# Patient Record
Sex: MALE | Race: WHITE
[De-identification: names, ages, dates, MRNs, and addresses within clinical notes are randomized per-mention and may not be internally consistent; named-entity substitution may affect disease eponyms.]

---

## 2017-05-05 ENCOUNTER — HOSPITAL ENCOUNTER (OUTPATIENT)
Dept: HOSPITAL 17 - HRSP | Age: 77
End: 2017-05-05
Attending: INTERNAL MEDICINE
Payer: MEDICARE

## 2017-05-05 DIAGNOSIS — J47.9: Primary | ICD-10-CM

## 2017-05-05 DIAGNOSIS — R06.02: ICD-10-CM

## 2017-05-05 PROCEDURE — 94729 DIFFUSING CAPACITY: CPT

## 2017-05-05 PROCEDURE — 94726 PLETHYSMOGRAPHY LUNG VOLUMES: CPT

## 2017-05-05 PROCEDURE — 94060 EVALUATION OF WHEEZING: CPT

## 2017-05-08 NOTE — RSPPFT
DATE OF PROCEDURE:   5/5/17



COMMENTS:   



VOLUMES

DYNAMIC:    FVC and FEV1 moderately reduced.

STATIC:    FRC mildly increased; RV moderately increased; TLC normal.

FLOWS:    FEV1% moderately reduced; FEF 25-75 severely reduced.

DIFFUSION:    Low normal.

FLOW VOLUME

      LOOP:    Pattern of variable intrathoracic airways obstruction.  



IMPRESSION:    

   

Moderate obstructive ventilatory defect with no significant reduction in diffusion. There 
is mild to moderate hyperinflation with increased airways resistance. There is improvement 
post-bronchodilator.

## 2017-08-31 ENCOUNTER — HOSPITAL ENCOUNTER (INPATIENT)
Dept: HOSPITAL 17 - NEPC | Age: 77
LOS: 3 days | Discharge: HOME HEALTH | DRG: 191 | End: 2017-09-03
Payer: MEDICARE

## 2017-08-31 VITALS
DIASTOLIC BLOOD PRESSURE: 88 MMHG | HEART RATE: 101 BPM | RESPIRATION RATE: 18 BRPM | SYSTOLIC BLOOD PRESSURE: 147 MMHG | OXYGEN SATURATION: 95 %

## 2017-08-31 VITALS
DIASTOLIC BLOOD PRESSURE: 77 MMHG | SYSTOLIC BLOOD PRESSURE: 152 MMHG | OXYGEN SATURATION: 95 % | HEART RATE: 100 BPM | RESPIRATION RATE: 32 BRPM

## 2017-08-31 VITALS — RESPIRATION RATE: 35 BRPM | OXYGEN SATURATION: 86 %

## 2017-08-31 VITALS
DIASTOLIC BLOOD PRESSURE: 84 MMHG | OXYGEN SATURATION: 91 % | RESPIRATION RATE: 20 BRPM | TEMPERATURE: 97.9 F | SYSTOLIC BLOOD PRESSURE: 164 MMHG | HEART RATE: 122 BPM

## 2017-08-31 VITALS
SYSTOLIC BLOOD PRESSURE: 147 MMHG | RESPIRATION RATE: 18 BRPM | DIASTOLIC BLOOD PRESSURE: 87 MMHG | TEMPERATURE: 98.1 F | HEART RATE: 108 BPM | OXYGEN SATURATION: 98 %

## 2017-08-31 VITALS
RESPIRATION RATE: 20 BRPM | HEART RATE: 115 BPM | SYSTOLIC BLOOD PRESSURE: 163 MMHG | OXYGEN SATURATION: 94 % | DIASTOLIC BLOOD PRESSURE: 83 MMHG | TEMPERATURE: 97.6 F

## 2017-08-31 VITALS — RESPIRATION RATE: 32 BRPM | OXYGEN SATURATION: 93 %

## 2017-08-31 VITALS — HEIGHT: 71 IN | BODY MASS INDEX: 21.7 KG/M2 | WEIGHT: 154.98 LBS

## 2017-08-31 DIAGNOSIS — Z92.21: ICD-10-CM

## 2017-08-31 DIAGNOSIS — J18.9: ICD-10-CM

## 2017-08-31 DIAGNOSIS — Q89.3: ICD-10-CM

## 2017-08-31 DIAGNOSIS — T38.0X5A: ICD-10-CM

## 2017-08-31 DIAGNOSIS — Z92.3: ICD-10-CM

## 2017-08-31 DIAGNOSIS — Z85.46: ICD-10-CM

## 2017-08-31 DIAGNOSIS — H91.90: ICD-10-CM

## 2017-08-31 DIAGNOSIS — I48.91: ICD-10-CM

## 2017-08-31 DIAGNOSIS — R73.03: ICD-10-CM

## 2017-08-31 DIAGNOSIS — L71.9: ICD-10-CM

## 2017-08-31 DIAGNOSIS — Z85.51: ICD-10-CM

## 2017-08-31 DIAGNOSIS — J47.0: Primary | ICD-10-CM

## 2017-08-31 DIAGNOSIS — F41.9: ICD-10-CM

## 2017-08-31 LAB
ALP SERPL-CCNC: 84 U/L (ref 45–117)
ALT SERPL-CCNC: 18 U/L (ref 12–78)
ANION GAP SERPL CALC-SCNC: 9 MEQ/L (ref 5–15)
APTT BLD: 29.3 SEC (ref 24.3–30.1)
AST SERPL-CCNC: 18 U/L (ref 15–37)
BASOPHILS # BLD AUTO: 0.1 TH/MM3 (ref 0–0.2)
BASOPHILS NFR BLD: 0.4 % (ref 0–2)
BILIRUB SERPL-MCNC: 0.4 MG/DL (ref 0.2–1)
BUN SERPL-MCNC: 16 MG/DL (ref 7–18)
CHLORIDE SERPL-SCNC: 102 MEQ/L (ref 98–107)
CK SERPL-CCNC: 66 U/L (ref 39–308)
EOSINOPHIL # BLD: 0.3 TH/MM3 (ref 0–0.4)
EOSINOPHIL NFR BLD: 1.9 % (ref 0–4)
ERYTHROCYTE [DISTWIDTH] IN BLOOD BY AUTOMATED COUNT: 15.6 % (ref 11.6–17.2)
GFR SERPLBLD BASED ON 1.73 SQ M-ARVRAT: 75 ML/MIN (ref 89–?)
HCO3 BLD-SCNC: 26.6 MEQ/L (ref 21–32)
HCT VFR BLD CALC: 45.9 % (ref 39–51)
HEMO FLAGS: (no result)
INR PPP: 1 RATIO
LYMPHOCYTES # BLD AUTO: 0.7 TH/MM3 (ref 1–4.8)
LYMPHOCYTES NFR BLD AUTO: 4.5 % (ref 9–44)
MCH RBC QN AUTO: 27.5 PG (ref 27–34)
MCHC RBC AUTO-ENTMCNC: 32.1 % (ref 32–36)
MCV RBC AUTO: 85.8 FL (ref 80–100)
MONOCYTES NFR BLD: 9.4 % (ref 0–8)
NEUTROPHILS # BLD AUTO: 13.2 TH/MM3 (ref 1.8–7.7)
NEUTROPHILS NFR BLD AUTO: 83.8 % (ref 16–70)
PLATELET # BLD: 234 TH/MM3 (ref 150–450)
POTASSIUM SERPL-SCNC: 4 MEQ/L (ref 3.5–5.1)
PROTHROMBIN TIME: 11.3 SEC (ref 9.8–11.6)
RBC # BLD AUTO: 5.35 MIL/MM3 (ref 4.5–5.9)
SODIUM SERPL-SCNC: 138 MEQ/L (ref 136–145)
WBC # BLD AUTO: 15.8 TH/MM3 (ref 4–11)

## 2017-08-31 PROCEDURE — 85025 COMPLETE CBC W/AUTO DIFF WBC: CPT

## 2017-08-31 PROCEDURE — 83036 HEMOGLOBIN GLYCOSYLATED A1C: CPT

## 2017-08-31 PROCEDURE — 82550 ASSAY OF CK (CPK): CPT

## 2017-08-31 PROCEDURE — 82948 REAGENT STRIP/BLOOD GLUCOSE: CPT

## 2017-08-31 PROCEDURE — 94668 MNPJ CHEST WALL SBSQ: CPT

## 2017-08-31 PROCEDURE — 94640 AIRWAY INHALATION TREATMENT: CPT

## 2017-08-31 PROCEDURE — 84443 ASSAY THYROID STIM HORMONE: CPT

## 2017-08-31 PROCEDURE — 83880 ASSAY OF NATRIURETIC PEPTIDE: CPT

## 2017-08-31 PROCEDURE — 93005 ELECTROCARDIOGRAM TRACING: CPT

## 2017-08-31 PROCEDURE — 93306 TTE W/DOPPLER COMPLETE: CPT

## 2017-08-31 PROCEDURE — 71010: CPT

## 2017-08-31 PROCEDURE — 94667 MNPJ CHEST WALL 1ST: CPT

## 2017-08-31 PROCEDURE — 94620: CPT

## 2017-08-31 PROCEDURE — 87040 BLOOD CULTURE FOR BACTERIA: CPT

## 2017-08-31 PROCEDURE — 83735 ASSAY OF MAGNESIUM: CPT

## 2017-08-31 PROCEDURE — 96360 HYDRATION IV INFUSION INIT: CPT

## 2017-08-31 PROCEDURE — 80053 COMPREHEN METABOLIC PANEL: CPT

## 2017-08-31 PROCEDURE — 80048 BASIC METABOLIC PNL TOTAL CA: CPT

## 2017-08-31 PROCEDURE — 94664 DEMO&/EVAL PT USE INHALER: CPT

## 2017-08-31 PROCEDURE — 94150 VITAL CAPACITY TEST: CPT

## 2017-08-31 PROCEDURE — 85730 THROMBOPLASTIN TIME PARTIAL: CPT

## 2017-08-31 PROCEDURE — 84484 ASSAY OF TROPONIN QUANT: CPT

## 2017-08-31 PROCEDURE — 85610 PROTHROMBIN TIME: CPT

## 2017-08-31 PROCEDURE — 71275 CT ANGIOGRAPHY CHEST: CPT

## 2017-08-31 RX ADMIN — DILTIAZEM HYDROCHLORIDE SCH MG: 30 TABLET, FILM COATED ORAL at 23:37

## 2017-08-31 RX ADMIN — AZITHROMYCIN SCH MLS/HR: 500 INJECTION, POWDER, LYOPHILIZED, FOR SOLUTION INTRAVENOUS at 23:37

## 2017-08-31 RX ADMIN — Medication SCH ML: at 23:38

## 2017-08-31 RX ADMIN — ENOXAPARIN SODIUM SCH MG: 30 INJECTION SUBCUTANEOUS at 22:00

## 2017-08-31 RX ADMIN — METHYLPREDNISOLONE SODIUM SUCCINATE SCH MG: 40 INJECTION, POWDER, FOR SOLUTION INTRAMUSCULAR; INTRAVENOUS at 23:38

## 2017-08-31 RX ADMIN — IPRATROPIUM BROMIDE AND ALBUTEROL SULFATE SCH AMPULE: .5; 3 SOLUTION RESPIRATORY (INHALATION) at 22:00

## 2017-08-31 NOTE — HHI.HP
HPI


Service


[facility]


Primary Care Physician


Sudhir Hendricks MD


Admission Diagnosis





Pneumonia, bronchietasis, new onset afib


Chief Complaint:  


sob, heart racing


Travel History


International Travel<30 Days:  No


Contact w/Intl Traveler <30 Da:  No


Traveled to Known Affected Are:  No





Sepsis Criteria


SIRS Criteria (2 or more):  RR  > 20 or PaCO2 < 32, WBC > 94725, < 4000 or > 10

% bands


Sepsis Criteria (SIRS+source):  Infect source susp/known


History of Present Illness


Patient is 77-year-old male with long history of bronchiectasis, presents to 

emergency room for evaluation of shortness of breath.  Patient reports that he 

has been feeling short of breath for several years but worse the past few 

months.  He does not require supplemental oxygen as an outpatient.  Reports 

that he has been seeing Dr. Waldemar Brown for years for his bronchiectasis in 

the office and reports that he has had a workup including a CT study of his 

chest which showed "infection".  Patient reports that he has been on multiple 

antibiotics and is currently taking ciprofloxacin.  He takes doxycycline daily 

for rosacea and reportedly was on tobramycin recently.  Patient reports that he'

s had a productive cough with thick white sputum for the past few months.  

Previously the phlegm was a dark yellow color.  He denies hemoptysis or foreign 

travel.  He is originally from [place].  Has not been in the [facility] 

or [facility] recently.  Denies fevers.  Patient was seen in the office 

by Dr. Brown today, was sent to the ER as he has progressing shortness of 

breath.  Patient reports dyspnea on exertion as well as at rest.  Denies 

history of hypertension, hyperlipidemia, he is a non smoker. Reports that Dr. Brown requested a cardiac/pulmonary workup for patient as REYES has been ongoing 

and progressing over the past few months.  Denies chest pain.  Typically is 

quite active but has been limited by shortness of breath of late.  He has noted 

that her heart seems to race at times especially when he is anxious.  Reports 

that he has never had a bronchoscopy.





Review of Systems


Constitutional:  COMPLAINS OF: Fatigue, DENIES: Diaphoretic episodes, Fever, 

Weight gain, Weight loss, Chills, Dizziness, Change in appetite, Night Sweats


Endocrine:  DENIES: Heat/cold intolerance, Polydipsia, Polyuria, Polyphagia


Eyes:  DENIES: Blurred vision, Diplopia, Eye inflammation, Eye pain, Vision loss

, Photosensitivity, Double Vision


Ears, nose, mouth, throat:  COMPLAINS OF: Hoarseness, DENIES: Tinnitus, Hearing 

loss, Vertigo, Nasal discharge, Oral lesions, Throat pain, Ear Pain, Running 

Nose, Epistaxis, Sinus Pain, Toothache, Odynophagia


Respiratory:  COMPLAINS OF: Cough, Wheezing, Sputum production, Shortness of 

breath, DENIES: Apneas, Snoring, Hemoptysis


Cardiovascular:  COMPLAINS OF: Palpitations, Dyspnea on Exertion, DENIES: Chest 

pain, Syncope, PND, Lower Extremity Edema, Orthopnea, Claudication


Gastrointestinal:  DENIES: Abdominal pain, Black stools, Bloody stools, BRB per 

rectum, Constipation, Diarrhea, GERD, Nausea, Reflux, Vomiting, Difficulty 

Swallowing, Anorexia, See HPI


Musculoskeletal:  DENIES: Joint pain, Muscle aches, Stiffness, Joint Swelling, 

Back pain, Neck pain


Integumentary:  DENIES: Abnormal pigmentation, Nail changes, Pruritus, Rash


Hematologic/lymphatic:  DENIES: Bruising, Lymphadenopathy


Immunologic/allergic:  DENIES: Eczema, Urticaria


Neurologic:  DENIES: Abnormal gait, Headache, Localized weakness, Paresthesias, 

Seizures, Speech Problems, Tremor, Poor Balance


Psychiatric:  COMPLAINS OF: Anxiety





Past Family Social History


Past Medical History


Bronchiectasis


History of multiple pneumonias


Rosacea


History of prostate cancer


History of bladder cancer


Past Surgical History


Nasal polypectomy


Balloon septoplasty


Radiation therapy for prostate cancer


Local chemotherapy and resection for bladder cancer


Reported Medications


Albuterol Neb (Albuterol Sulfate) 1.25 Mg/3 Ml Neb 1.25 Mg NEB Q4HR NEB PRN


Ipratropium Neb (Ipratropium Bromide) 0.5 Mg/2.5 Ml Amp 0.5 Mg NEB Q12HR NEB


Cipro (Ciprofloxacin HCl) 500 Mg Tab 500 Mg PO BID


Allergies:  


Coded Allergies:  


     cephalexin (Verified  Allergy, Severe, Nausea/Vomiting, 17)


Family History


Mother  at age 75 from cervical cancer


Father  at age 84 of "broken heart"


No significant lung ailments


Social History


No tobacco in 35 years and at that time he only smoked a pipe on rare occasion


Rare alcohol use


Denies illicit drug use


Lives with his wife of 49 years


3 adult children, not living in the immediate area


Retired  from [place] who moved here in 


Attending a local [employer].





Physical Exam


Vital Signs





Vital Signs








  Date Time  Temp Pulse Resp B/P (MAP) Pulse Ox O2 Delivery O2 Flow Rate FiO2


 


17 20:04  101 18 147/88 (107) 95 Nasal Cannula 2.00 


 


17 18:12  100 32 152/77 (102) 95 Nasal Cannula 2.00 


 


17 18:10   35  86 Room Air  


 


17 16:01   32  93 Room Air  


 


17 15:31 97.9 122 20 164/84 (110) 91 Room Air  








Physical Exam


GENERAL: This is a well-nourished, well-developed patient, in no apparent 

distress.  Alert and oriented.  Pleasant.  Cooperative with exam.


SKIN: No rashes, ecchymoses or lesions. Cool and dry.


HEAD: Atraumatic. Normocephalic. No temporal or scalp tenderness.


EYES: Pupils equal round and reactive. Extraocular motions intact. No scleral 

icterus. No injection or drainage. 


ENT: Nose without bleeding, purulent drainage or septal hematoma. Airway patent.


NECK: Trachea midline. No JVD or lymphadenopathy. Supple, nontender, no 

meningeal signs.


CARDIOVASCULAR: Irregularly irregular rate and rhythm without murmurs, gallops, 

or rubs.  Rate in the low 100s on my exam 


RESPIRATORY: Coarse breath sounds throughout lung fields.. Breath sounds equal 

bilaterally.  Occasional expiratory wheeze with no fine crackles.  Fair air 

movement.  


GASTROINTESTINAL: Abdomen soft, non-tender, nondistended. No hepato-splenomegaly

, or palpable masses. No guarding.


MUSCULOSKELETAL: Extremities without clubbing, cyanosis, or edema. No joint 

tenderness, effusion, or edema noted. No calf tenderness. 


NEUROLOGICAL: Awake and alert. Cranial nerves II through XII intact.  Motor and 

sensory grossly within normal limits. Five out of 5 muscle strength in all 

muscle groups.  Normal speech.


Laboratory





Laboratory Tests








Test


  17


16:00


 


White Blood Count 15.8 


 


Red Blood Count 5.35 


 


Hemoglobin 14.7 


 


Hematocrit 45.9 


 


Mean Corpuscular Volume 85.8 


 


Mean Corpuscular Hemoglobin 27.5 


 


Mean Corpuscular Hemoglobin


Concent 32.1 


 


 


Red Cell Distribution Width 15.6 


 


Platelet Count 234 


 


Mean Platelet Volume 7.5 


 


Neutrophils (%) (Auto) 83.8 


 


Lymphocytes (%) (Auto) 4.5 


 


Monocytes (%) (Auto) 9.4 


 


Eosinophils (%) (Auto) 1.9 


 


Basophils (%) (Auto) 0.4 


 


Neutrophils # (Auto) 13.2 


 


Lymphocytes # (Auto) 0.7 


 


Monocytes # (Auto) 1.5 


 


Eosinophils # (Auto) 0.3 


 


Basophils # (Auto) 0.1 


 


CBC Comment DIFF FINAL 


 


Differential Comment  


 


Prothrombin Time 11.3 


 


Prothromb Time International


Ratio 1.0 


 


 


Activated Partial


Thromboplast Time 29.3 


 


 


Blood Urea Nitrogen 16 


 


Creatinine 0.97 


 


Random Glucose 87 


 


Total Protein 7.4 


 


Albumin 3.4 


 


Calcium Level 8.8 


 


Alkaline Phosphatase 84 


 


Aspartate Amino Transf


(AST/SGOT) 18 


 


 


Alanine Aminotransferase


(ALT/SGPT) 18 


 


 


Total Bilirubin 0.4 


 


Sodium Level 138 


 


Potassium Level 4.0 


 


Chloride Level 102 


 


Carbon Dioxide Level 26.6 


 


Anion Gap 9 


 


Estimat Glomerular Filtration


Rate 75 


 


 


Total Creatine Kinase 66 


 


Troponin I LESS THAN 0.02 


 


B-Type Natriuretic Peptide 27 








Result Diagram:  


17 1600                                                                   

             17 1600





Imaging





Last 72 hours Impressions








CT Angiography 17 1630 Signed





Impressions: 





 Service Date/Time:   17:28 - CONCLUSION:  1. No 





 evidence of pulmonary embolism. 2. Situs inversus totalis. 3. Bronchiectasis 





 greatest in the lung bases with chronic scarring and reticulonodular 

opacities. 





 4. Bilateral calcified pleural plaques. 5. Mediastinal and hilar adenopathy. 





 This may be reactive.      Rian Bingham MD 


 


Chest X-Ray 17 1551 Signed





Impressions: 





 Service Date/Time:   16:41 - CONCLUSION:  1. 





 Hyperinflation underlying emphysema. 2. Coarse infiltrate in both lung bases 





 with bronchiectasis.     Rian Bingham MD 











Septic Shock Reassessment


Heart:  Irregular


Lungs:  Course


Skin:  Warm








Peripheral Pulses:   Bounding Right Popliteal





 Bounding Left Popliteal





 Bounding Right Dorsalis Pedis





 Bounding Left Dorsalis Pedis








Capillary Refill:  Brisk





Caprini VTE Risk Assessment


Caprini VTE Risk Assessment:  Mod/High Risk (score >= 2)


Caprini Risk Assessment Model











 Point Value = 1          Point Value = 2  Point Value = 3  Point Value = 5


 


Age 41-60


Minor surgery


BMI > 25 kg/m2


Swollen legs


Varicose veins


Pregnancy or postpartum


History of unexplained or recurrent


   spontaneous 


Oral contraceptives or hormone


   replacement


Sepsis (< 1 month)


Serious lung disease, including


   pneumonia (< 1 month)


Abnormal pulmonary function


Acute myocardial infarction


Congestive heart failure (< 1 month)


History of inflammatory bowel disease


Medical patient at bed rest Age 61-74


Arthroscopic surgery


Major open surgery (> 45 min)


Laparoscopic surgery (> 45 min)


Malignancy


Confined to bed (> 72 hours)


Immobilizing plaster cast


Central venous access Age >= 75


History of VTE


Family history of VTE


Factor V Leiden


Prothrombin 28656U


Lupus anticoagulant


Anticardiolipin antibodies


Elevated serum homocysteine


Heparin-induced thrombocytopenia


Other congenital or acquired


   thrombophilia Stroke (< 1 month)


Elective arthroplasty


Hip, pelvis, or leg fracture


Acute spinal cord injury (< 1 month)








Prophylaxis Regimen











   Total Risk


Factor Score Risk Level Prophylaxis Regimen


 


0-1      Low Early ambulation


 


2 Moderate Order ONE of the following:


*Sequential Compression Device (SCD)


*Heparin 5000 units SQ BID


 


3-4 Higher Order ONE of the following medications:


*Heparin 5000 units SQ TID


*Enoxaparin/Lovenox 40 mg SQ daily (WT < 150 kg, CrCl > 30 mL/min)


*Enoxaparin/Lovenox 30 mg SQ daily (WT < 150 kg, CrCl > 10-29 mL/min)


*Enoxaparin/Lovenox 30 mg SQ BID (WT < 150 kg, CrCl > 30 mL/min)


AND/OR


*Sequential Compression Device (SCD)


 


5 or more Highest Order ONE of the following medications:


*Heparin 5000 units SQ TID (Preferred with Epidurals)


*Enoxaparin/Lovenox 40 mg SQ daily (WT < 150 kg, CrCl > 30 mL/min)


*Enoxaparin/Lovenox 30 mg SQ daily (WT < 150 kg, CrCl > 10-29 mL/min)


*Enoxaparin/Lovenox 30 mg SQ BID (WT < 150 kg, CrCl > 30 mL/min)


AND


*Sequential Compression Device (SCD)











Assessment and Plan


Problem List:  


(1) Pneumonia


ICD Codes:  J18.9 - Pneumonia, unspecified organism


Status:  Acute


Plan:  Likely source of underlying sepsis.


Vital signs relatively stable.


It is noted that patient has underlying bronchiectasis with situs inversus and 

multiple pneumonias.  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


I will add azithromycin for atypical organism coverage.


We'll have pulmonary see the patient for possible bronchoscopy given his long 

history.





(2) Bronchiectasis


ICD Codes:  J47.9 - Bronchiectasis, uncomplicated


Plan:  As noted above.





(3) Afib


ICD Codes:  I48.91 - Unspecified atrial fibrillation


Status:  Acute


Plan:  Appears to be relatively new onset and may be associated with underlying 

chronic lung condition.


We'll check labs, echo and rule out possible coronary etiology.


He appears relatively healthy but may benefit from a stress test at some point 

either during or shortly after this hospital stay.


We'll use oral Cardizem for rate control.





Code Status


Full


Discussed Condition With


Patient and ER provider.





Physician Certification


2 Midnight Certification Type:  Admission for Inpatient Services


Order for Inpatient Services


The services are ordered in accordance with Medicare regulations or non-

Medicare payer requirements, as applicable.  In the case of services not 

specified as inpatient-only, they are appropriately provided as inpatient 

services in accordance with the 2-midnight benchmark.


Estimated LOS (days):  3


 days is the estimated time the patient will need to remain in the hospital, 

assuming treatment plan goals are met and no additional complications.


Post-Hospital Plan:  Home





Problem Qualifiers





(1) Pneumonia:  


Qualified Codes:  J18.9 - Pneumonia, unspecified organism


(2) Afib:  


Qualified Codes:  I48.91 - Unspecified atrial fibrillation








Toy Ribera MD PhD Aug 31, 2017 20:45

## 2017-08-31 NOTE — RADRPT
EXAM DATE/TIME:  08/31/2017 17:28 

 

[facility] COMPARISON:     

No previous studies available for comparison.

 

 

INDICATIONS :     

Short of breath for three months.

                      

 

IV CONTRAST:     

60 cc Omnipaque 350 (iohexol) IV 

 

 

RADIATION DOSE:     

41 CTDIvol (mGy) 

 

 

MEDICAL HISTORY :     

Chronic obstructive pulmonary disease.  Sinus inversa

 

SURGICAL HISTORY :      

None. 

 

ENCOUNTER:      

Initial

 

ACUITY:      

3 months

 

PAIN SCALE:      

2/10

 

LOCATION:       

Bilateral chest 

 

TECHNIQUE:     

Volumetric scanning of the chest was performed using a pulmonary embolism protocol MIP images were re
constructed.  Using automated exposure control and adjustment of the mA and/or kV according to patien
t size, radiation dose was kept as low as reasonably achievable to obtain optimal diagnostic quality 
images.   DICOM format image data is available electronically for review and comparison.  

 

Follow-up recommendations for detected pulmonary nodules are based at a minimum on nodule size and pa
tient risk factors according to Fleischner Society Guidelines.

 

FINDINGS:     

Situs inversus totalis is present. There is a right-sided arch and abdominal organs are reversed.

 

PULMONARY ARTERIES:

No filling defects are seen in the pulmonary arteries through the segmental level.

 

LUNGS:     

There is no consolidation or pneumothorax .  No concerning pulmonary nodule is visualized. Bronchiect
asis is noted greatest in the lower lobes. Reticular nodular opacities are present in both lower lobe
s as well right greater than left. There is chronic scarring in the posterior lung bases. The lungs a
re hyperinflated.

 

PLEURAE:     

There bilateral calcified pleural plaques. There is no distinct effusion. 

 

MEDIASTINUM:     

There is good visualization of the great vessels of the middle mediastinum. The there is borderline a
denopathy in pretracheal region. There are prominent lymph nodes in both hilar regions and the subcar
inal region.. Coronary artery calcifications are present.

 

MUSCULOSKELETAL:     

Within normal limits for patient age.

 

MISCELLANEOUS:     

The visualized upper abdominal organs demonstrate no acute abnormality. Cystic structures are noted i
n both kidneys.

 

CONCLUSION:     

1. No evidence of pulmonary embolism.

2. Situs inversus totalis.

3. Bronchiectasis greatest in the lung bases with chronic scarring and reticulonodular opacities.

4. Bilateral calcified pleural plaques.

5. Mediastinal and hilar adenopathy. This may be reactive.

 

 

 

 

 iRan Bingham MD on August 31, 2017 at 17:53           

Board Certified Radiologist.

 This report was verified electronically.

## 2017-08-31 NOTE — PD
HPI


Chief Complaint:  Respiratory Distress


Time Seen by Provider:  15:39


Travel History


International Travel<30 days:  No


Contact w/Intl Traveler<30days:  No


Traveled to known affect area:  No





History of Present Illness


HPI


Patient is 77-year-old male with history of bronchiectasis, presents to 

emergency room for evaluation of shortness of breath.  Patient reports that he 

has been feeling short of breath for the past few months.  Reports that he has 

been seeing Dr. Waldemar Brown in the office and reports that he has had a workup 

including a CT study of his chest which showed infection.  Patient reports that 

he has been on multiple antibiotics and is currently taking ciprofloxacin.  

Patient reports that he's had a productive cough with thick white sputum for 

the past few months.  Patient was seen in the office by Dr. brown today, was 

sent to the ER as he has progressing shortness of breath.  Patient reports 

dyspnea on exertion as well as at rest.  Denies history of hypertension, 

hyperlipidemia, he is a non smoker. Reports that Dr. Brown requested a cardiac/

pulmonary workup for patient as REYES has been ongoing and progressing over the 

past few months.





PFSH


Past Medical History


COPD:  Yes


Diminished Hearing:  Yes ([place])


Medical other:  Yes (SITUS INVERSUS)


Respiratory:  Yes (copd)


Tetanus Vaccination:  > 5 Years


Influenza Vaccination:  No





Past Surgical History


Tonsillectomy:  Yes


Other Surgery:  Yes (NASAL POLYPS)





Social History


Alcohol Use:  No


Tobacco Use:  No


Substance Use:  No





Allergies-Medications


(Allergen,Severity, Reaction):  


Coded Allergies:  


     cephalexin (Verified  Allergy, Severe, Nausea/Vomiting, 8/31/17)


Reported Meds & Prescriptions





Reported Meds & Active Scripts


Active


Reported


Albuterol Neb (Albuterol Sulfate) 1.25 Mg/3 Ml Neb 1.25 Mg NEB Q4HR NEB PRN


Ipratropium Neb (Ipratropium Bromide) 0.5 Mg/2.5 Ml Amp 0.5 Mg NEB Q12HR NEB


Cipro (Ciprofloxacin HCl) 500 Mg Tab 500 Mg PO BID








Review of Systems


General / Constitutional:  No: Fever


Eyes:  No: Visual changes


HENT:  No: Headaches


Cardiovascular:  Positive: Tachycardia, No: Chest Pain or Discomfort


Respiratory:  Positive: Cough, Shortness of Breath


Gastrointestinal:  No: Abdominal Pain


Genitourinary:  No: Dysuria


Musculoskeletal:  No: Pain


Skin:  No Rash


Neurologic:  No: Weakness


Psychiatric:  No: Depression


Endocrine:  No: Polydipsia


Hematologic/Lymphatic:  No: Easy Bruising





Physical Exam


Narrative


GENERAL:mild distress


SKIN: Focused skin assessment warm/dry.


HEAD: Atraumatic. Normocephalic. 


EYES: Pupils equal and round. No scleral icterus. No injection or drainage. 


ENT: No nasal bleeding or discharge.  Mucous membranes pink and moist.


NECK: Trachea midline. No JVD. 


CARDIOVASCULAR: Tachycardic, irregular irregular.  No murmur appreciated.


RESPIRATORY: No accessory muscle use. Clear to auscultation. Breath sounds 

equal bilaterally. 


GASTROINTESTINAL: Abdomen soft, non-tender, nondistended. Hepatic and splenic 

margins not palpable. 


MUSCULOSKELETAL: No obvious deformities. No clubbing.  No cyanosis.  No edema. 


NEUROLOGICAL: Awake and alert. No obvious cranial nerve deficits.  Motor 

grossly within normal limits. Normal speech.


PSYCHIATRIC: Appropriate mood and affect; insight and judgment normal.





Data


Data


Last Documented VS





Vital Signs








  Date Time  Temp Pulse Resp B/P (MAP) Pulse Ox O2 Delivery O2 Flow Rate FiO2


 


8/31/17 18:12  100 20 152/77 (102) 92 Room Air  


 


8/31/17 15:31 97.9       








Orders





 Orders


Electrocardiogram (8/31/17 15:55)


B-Type Natriuretic Peptide (8/31/17 15:55)


Ckmb (Isoenzyme) Profile (8/31/17 15:55)


Complete Blood Count With Diff (8/31/17 15:55)


Comprehensive Metabolic Panel (8/31/17 15:55)


Prothrombin Time / Inr (Pt) (8/31/17 15:55)


Act Partial Throm Time (Ptt) (8/31/17 15:55)


Troponin I (8/31/17 15:55)


Chest, Single Ap (8/31/17 15:55)


Ecg Monitoring (8/31/17 15:55)


Iv Access Insert/Monitor (8/31/17 15:55)


Oximetry (8/31/17 15:55)


Sodium Chloride 0.9% Flush (Ns Flush) (8/31/17 16:00)


Sodium Chlorid 0.9% 500 Ml Inj (Ns 500 M (8/31/17 16:00)


Ct Pulmonary Angiogram (8/31/17 16:30)


Iohexol 350 Inj (Omnipaque 350 Inj) (8/31/17 15:28)


Aztreonam Inj (Azactam Inj) (8/31/17 18:30)


Levofloxacin 750 Mg Premix Inj (Levaquin (8/31/17 18:30)


Aspirin Chew (Aspirin Chew) (8/31/17 18:30)


Admit Order (Ed Use Only) (8/31/17 18:32)





Labs





Laboratory Tests








Test


  8/31/17


16:00


 


White Blood Count 15.8 TH/MM3 


 


Red Blood Count 5.35 MIL/MM3 


 


Hemoglobin 14.7 GM/DL 


 


Hematocrit 45.9 % 


 


Mean Corpuscular Volume 85.8 FL 


 


Mean Corpuscular Hemoglobin 27.5 PG 


 


Mean Corpuscular Hemoglobin


Concent 32.1 % 


 


 


Red Cell Distribution Width 15.6 % 


 


Platelet Count 234 TH/MM3 


 


Mean Platelet Volume 7.5 FL 


 


Neutrophils (%) (Auto) 83.8 % 


 


Lymphocytes (%) (Auto) 4.5 % 


 


Monocytes (%) (Auto) 9.4 % 


 


Eosinophils (%) (Auto) 1.9 % 


 


Basophils (%) (Auto) 0.4 % 


 


Neutrophils # (Auto) 13.2 TH/MM3 


 


Lymphocytes # (Auto) 0.7 TH/MM3 


 


Monocytes # (Auto) 1.5 TH/MM3 


 


Eosinophils # (Auto) 0.3 TH/MM3 


 


Basophils # (Auto) 0.1 TH/MM3 


 


CBC Comment DIFF FINAL 


 


Differential Comment  


 


Prothrombin Time 11.3 SEC 


 


Prothromb Time International


Ratio 1.0 RATIO 


 


 


Activated Partial


Thromboplast Time 29.3 SEC 


 


 


Blood Urea Nitrogen 16 MG/DL 


 


Creatinine 0.97 MG/DL 


 


Random Glucose 87 MG/DL 


 


Total Protein 7.4 GM/DL 


 


Albumin 3.4 GM/DL 


 


Calcium Level 8.8 MG/DL 


 


Alkaline Phosphatase 84 U/L 


 


Aspartate Amino Transf


(AST/SGOT) 18 U/L 


 


 


Alanine Aminotransferase


(ALT/SGPT) 18 U/L 


 


 


Total Bilirubin 0.4 MG/DL 


 


Sodium Level 138 MEQ/L 


 


Potassium Level 4.0 MEQ/L 


 


Chloride Level 102 MEQ/L 


 


Carbon Dioxide Level 26.6 MEQ/L 


 


Anion Gap 9 MEQ/L 


 


Estimat Glomerular Filtration


Rate 75 ML/MIN 


 


 


Total Creatine Kinase 66 U/L 


 


Troponin I


  LESS THAN 0.02


NG/ML


 


B-Type Natriuretic Peptide 27 PG/ML 











MDM


Medical Decision Making


Medical Screen Exam Complete:  Yes


Emergency Medical Condition:  Yes


Interpretation(s)


EKG at 1555: A fib at 110, qt/qtc: 327/392


Differential Diagnosis


Differential includes PE, A. fib with RVR, CHF exacerbation, PE, pneumonia, 

electrolyte abnormality


Narrative Course


Patient is a 77-year-old male sent into the hospital by Dr. Brown for 

evaluation of sob.  Patient has been having shortness of breath for the past 

few months, reports that dyspnea on exertion has progressively gotten worse. He 

currently is on a course of antibiotics as he had a CT study of his chest a few 

months ago which showed infection








Vital Signs








  Date Time  Temp Pulse Resp B/P (MAP) Pulse Ox O2 Delivery O2 Flow Rate FiO2


 


8/31/17 18:12  100 20 152/77 (102) 92 Room Air  


 


8/31/17 16:01   32  93 Room Air  


 


8/31/17 15:31 97.9 122 20 164/84 (110) 91 Room Air  








Laboratory Tests








Test


  8/31/17


16:00


 


White Blood Count


  15.8 TH/MM3


(4.0-11.0)


 


Red Blood Count


  5.35 MIL/MM3


(4.50-5.90)


 


Hemoglobin


  14.7 GM/DL


(13.0-17.0)


 


Hematocrit


  45.9 %


(39.0-51.0)


 


Mean Corpuscular Volume


  85.8 FL


(80.0-100.0)


 


Mean Corpuscular Hemoglobin


  27.5 PG


(27.0-34.0)


 


Mean Corpuscular Hemoglobin


Concent 32.1 %


(32.0-36.0)


 


Red Cell Distribution Width


  15.6 %


(11.6-17.2)


 


Platelet Count


  234 TH/MM3


(150-450)


 


Mean Platelet Volume


  7.5 FL


(7.0-11.0)


 


Neutrophils (%) (Auto)


  83.8 %


(16.0-70.0)


 


Lymphocytes (%) (Auto)


  4.5 %


(9.0-44.0)


 


Monocytes (%) (Auto)


  9.4 %


(0.0-8.0)


 


Eosinophils (%) (Auto)


  1.9 %


(0.0-4.0)


 


Basophils (%) (Auto)


  0.4 %


(0.0-2.0)


 


Neutrophils # (Auto)


  13.2 TH/MM3


(1.8-7.7)


 


Lymphocytes # (Auto)


  0.7 TH/MM3


(1.0-4.8)


 


Monocytes # (Auto)


  1.5 TH/MM3


(0-0.9)


 


Eosinophils # (Auto)


  0.3 TH/MM3


(0-0.4)


 


Basophils # (Auto)


  0.1 TH/MM3


(0-0.2)


 


CBC Comment DIFF FINAL 


 


Differential Comment  


 


Prothrombin Time


  11.3 SEC


(9.8-11.6)


 


Prothromb Time International


Ratio 1.0 RATIO 


 


 


Activated Partial


Thromboplast Time 29.3 SEC


(24.3-30.1)


 


Blood Urea Nitrogen


  16 MG/DL


(7-18)


 


Creatinine


  0.97 MG/DL


(0.60-1.30)


 


Random Glucose


  87 MG/DL


()


 


Total Protein


  7.4 GM/DL


(6.4-8.2)


 


Albumin


  3.4 GM/DL


(3.4-5.0)


 


Calcium Level


  8.8 MG/DL


(8.5-10.1)


 


Alkaline Phosphatase


  84 U/L


()


 


Aspartate Amino Transf


(AST/SGOT) 18 U/L (15-37) 


 


 


Alanine Aminotransferase


(ALT/SGPT) 18 U/L (12-78) 


 


 


Total Bilirubin


  0.4 MG/DL


(0.2-1.0)


 


Sodium Level


  138 MEQ/L


(136-145)


 


Potassium Level


  4.0 MEQ/L


(3.5-5.1)


 


Chloride Level


  102 MEQ/L


()


 


Carbon Dioxide Level


  26.6 MEQ/L


(21.0-32.0)


 


Anion Gap 9 MEQ/L (5-15) 


 


Estimat Glomerular Filtration


Rate 75 ML/MIN


(>89)


 


Total Creatine Kinase


  66 U/L


()


 


Troponin I


  LESS THAN 0.02


NG/ML


 


B-Type Natriuretic Peptide


  27 PG/ML


(0-100)








Last Impressions








CT Angiography 8/31/17 1630 Signed





Impressions: 





 Service Date/Time:  Thursday, August 31, 2017 17:28 - CONCLUSION:  1. No 





 evidence of pulmonary embolism. 2. Situs inversus totalis. 3. Bronchiectasis 





 greatest in the lung bases with chronic scarring and reticulonodular 

opacities. 





 4. Bilateral calcified pleural plaques. 5. Mediastinal and hilar adenopathy. 





 This may be reactive.      Rian Bingham MD 








patient with reticulonodular opacities with wbc of 15.8, patient with allergy 

to keflex, will treat with levaquin and azactam. Patient has been pancultured - 

will require admission. patient also with new onset afib





case reviewed with dr. joe who accepts pt to service under dr samuels





Diagnosis





 Primary Impression:  


 Pneumonia


 Qualified Codes:  J18.9 - Pneumonia, unspecified organism


 Additional Impressions:  


 SOB (shortness of breath)


 Afib


 Qualified Codes:  I48.91 - Unspecified atrial fibrillation





Admitting Information


Admitting Physician Requests:  Observation











Maria A Torres DO Aug 31, 2017 16:15

## 2017-08-31 NOTE — RADRPT
EXAM DATE/TIME:  08/31/2017 16:41 

 

[facility] COMPARISON:     

No previous studies available for comparison.

 

                     

INDICATIONS :     

Shortness of breath. Situs inversus.

                     

 

MEDICAL HISTORY :     

Chronic obstructive pulmonary disease.          

 

SURGICAL HISTORY :     

None.   

 

ENCOUNTER:     

Initial                                        

 

ACUITY:     

3 months      

 

PAIN SCORE:     

0/10

 

LOCATION:     

Bilateral chest 

 

FINDINGS:     

2 AP portable erect views of the chest were obtained and demonstrate situs inversus. There is hyperin
flation of both lungs with coarse opacity in both lower lobes and bronchiectasis. The heart size is a
t the upper limits of normal. There is no perihilar edema. Overlying retrocardiac leads are present. 
The bony thorax is intact.

 

CONCLUSION:     

1. Hyperinflation underlying emphysema.

2. Coarse infiltrate in both lung bases with bronchiectasis. 

 

 

 Rian Bingham MD on August 31, 2017 at 18:22           

Board Certified Radiologist.

 This report was verified electronically.

## 2017-09-01 VITALS
RESPIRATION RATE: 20 BRPM | HEART RATE: 81 BPM | TEMPERATURE: 97.8 F | OXYGEN SATURATION: 94 % | DIASTOLIC BLOOD PRESSURE: 67 MMHG | SYSTOLIC BLOOD PRESSURE: 147 MMHG

## 2017-09-01 VITALS
DIASTOLIC BLOOD PRESSURE: 83 MMHG | RESPIRATION RATE: 20 BRPM | HEART RATE: 90 BPM | OXYGEN SATURATION: 96 % | SYSTOLIC BLOOD PRESSURE: 138 MMHG | TEMPERATURE: 97.4 F

## 2017-09-01 VITALS
HEART RATE: 90 BPM | RESPIRATION RATE: 20 BRPM | TEMPERATURE: 97.6 F | OXYGEN SATURATION: 94 % | SYSTOLIC BLOOD PRESSURE: 155 MMHG | DIASTOLIC BLOOD PRESSURE: 84 MMHG

## 2017-09-01 VITALS
TEMPERATURE: 97.2 F | OXYGEN SATURATION: 99 % | SYSTOLIC BLOOD PRESSURE: 107 MMHG | RESPIRATION RATE: 20 BRPM | DIASTOLIC BLOOD PRESSURE: 71 MMHG | HEART RATE: 96 BPM

## 2017-09-01 VITALS — HEART RATE: 81 BPM

## 2017-09-01 VITALS
RESPIRATION RATE: 20 BRPM | TEMPERATURE: 97.2 F | SYSTOLIC BLOOD PRESSURE: 162 MMHG | OXYGEN SATURATION: 91 % | HEART RATE: 102 BPM | DIASTOLIC BLOOD PRESSURE: 88 MMHG

## 2017-09-01 VITALS — OXYGEN SATURATION: 91 %

## 2017-09-01 VITALS
SYSTOLIC BLOOD PRESSURE: 134 MMHG | OXYGEN SATURATION: 95 % | HEART RATE: 99 BPM | RESPIRATION RATE: 20 BRPM | TEMPERATURE: 97.4 F | DIASTOLIC BLOOD PRESSURE: 85 MMHG

## 2017-09-01 VITALS — HEART RATE: 87 BPM

## 2017-09-01 VITALS — OXYGEN SATURATION: 95 %

## 2017-09-01 VITALS — HEART RATE: 104 BPM

## 2017-09-01 LAB
ANION GAP SERPL CALC-SCNC: 10 MEQ/L (ref 5–15)
BASOPHILS # BLD AUTO: 0 TH/MM3 (ref 0–0.2)
BASOPHILS NFR BLD: 0.1 % (ref 0–2)
BUN SERPL-MCNC: 16 MG/DL (ref 7–18)
CHLORIDE SERPL-SCNC: 102 MEQ/L (ref 98–107)
CK SERPL-CCNC: 63 U/L (ref 39–308)
EOSINOPHIL # BLD: 0 TH/MM3 (ref 0–0.4)
EOSINOPHIL NFR BLD: 0 % (ref 0–4)
ERYTHROCYTE [DISTWIDTH] IN BLOOD BY AUTOMATED COUNT: 15.4 % (ref 11.6–17.2)
GFR SERPLBLD BASED ON 1.73 SQ M-ARVRAT: 91 ML/MIN (ref 89–?)
HCO3 BLD-SCNC: 25.8 MEQ/L (ref 21–32)
HCT VFR BLD CALC: 44 % (ref 39–51)
HEMO FLAGS: (no result)
LYMPHOCYTES # BLD AUTO: 0.2 TH/MM3 (ref 1–4.8)
LYMPHOCYTES NFR BLD AUTO: 1.9 % (ref 9–44)
MCH RBC QN AUTO: 28.1 PG (ref 27–34)
MCHC RBC AUTO-ENTMCNC: 32.6 % (ref 32–36)
MCV RBC AUTO: 86.3 FL (ref 80–100)
MONOCYTES NFR BLD: 1.1 % (ref 0–8)
NEUTROPHILS # BLD AUTO: 12.4 TH/MM3 (ref 1.8–7.7)
NEUTROPHILS NFR BLD AUTO: 96.9 % (ref 16–70)
PLATELET # BLD: 198 TH/MM3 (ref 150–450)
POTASSIUM SERPL-SCNC: 4 MEQ/L (ref 3.5–5.1)
RBC # BLD AUTO: 5.1 MIL/MM3 (ref 4.5–5.9)
SODIUM SERPL-SCNC: 138 MEQ/L (ref 136–145)
WBC # BLD AUTO: 12.8 TH/MM3 (ref 4–11)

## 2017-09-01 RX ADMIN — DILTIAZEM HYDROCHLORIDE SCH MG: 60 TABLET, FILM COATED ORAL at 23:52

## 2017-09-01 RX ADMIN — IPRATROPIUM BROMIDE AND ALBUTEROL SULFATE SCH AMPULE: .5; 3 SOLUTION RESPIRATORY (INHALATION) at 04:14

## 2017-09-01 RX ADMIN — AZITHROMYCIN SCH MLS/HR: 500 INJECTION, POWDER, LYOPHILIZED, FOR SOLUTION INTRAVENOUS at 22:10

## 2017-09-01 RX ADMIN — LEVOFLOXACIN SCH MLS/HR: 5 INJECTION, SOLUTION INTRAVENOUS at 22:09

## 2017-09-01 RX ADMIN — METHYLPREDNISOLONE SODIUM SUCCINATE SCH MG: 40 INJECTION, POWDER, FOR SOLUTION INTRAMUSCULAR; INTRAVENOUS at 08:26

## 2017-09-01 RX ADMIN — DILTIAZEM HYDROCHLORIDE SCH MG: 60 TABLET, FILM COATED ORAL at 13:32

## 2017-09-01 RX ADMIN — IPRATROPIUM BROMIDE AND ALBUTEROL SULFATE SCH AMPULE: .5; 3 SOLUTION RESPIRATORY (INHALATION) at 21:20

## 2017-09-01 RX ADMIN — METHYLPREDNISOLONE SODIUM SUCCINATE SCH MG: 40 INJECTION, POWDER, FOR SOLUTION INTRAMUSCULAR; INTRAVENOUS at 22:10

## 2017-09-01 RX ADMIN — DILTIAZEM HYDROCHLORIDE SCH MG: 60 TABLET, FILM COATED ORAL at 17:30

## 2017-09-01 RX ADMIN — IPRATROPIUM BROMIDE AND ALBUTEROL SULFATE SCH AMPULE: .5; 3 SOLUTION RESPIRATORY (INHALATION) at 15:26

## 2017-09-01 RX ADMIN — IPRATROPIUM BROMIDE AND ALBUTEROL SULFATE SCH AMPULE: .5; 3 SOLUTION RESPIRATORY (INHALATION) at 09:42

## 2017-09-01 RX ADMIN — Medication SCH ML: at 08:26

## 2017-09-01 RX ADMIN — Medication SCH ML: at 22:11

## 2017-09-01 RX ADMIN — ENOXAPARIN SODIUM SCH MG: 30 INJECTION SUBCUTANEOUS at 22:10

## 2017-09-01 RX ADMIN — DILTIAZEM HYDROCHLORIDE SCH MG: 30 TABLET, FILM COATED ORAL at 05:01

## 2017-09-01 NOTE — HHI.PR
Subjective


Remarks


Pt overall feels slightly better today


He was able to cough up some green sputum this morning


Pt still in A.fib RVR on telmetry with HR in the 130-140s





Objective


Vitals





Vital Signs








  Date Time  Temp Pulse Resp B/P (MAP) Pulse Ox O2 Delivery O2 Flow Rate FiO2


 


9/1/17 09:45     95 Nasal Cannula 2.00 


 


9/1/17 08:15  104      


 


9/1/17 08:00 97.6 90 20 155/84 (107) 94   


 


9/1/17 07:52      Nasal Cannula 2.00 


 


9/1/17 04:55     95 Nasal Cannula 2.00 


 


9/1/17 04:00 97.4 99 20 134/85 (101) 95   


 


9/1/17 00:26  87      


 


9/1/17 00:00 97.4 90 20 138/83 (101) 96   


 


8/31/17 22:15 97.6 115 20 163/83 (109) 94   


 


8/31/17 21:52        


 


8/31/17 21:45 98.1 108 18 147/87 (107) 98 Nasal Cannula 2.00 


 


8/31/17 20:04  101 18 147/88 (107) 95 Nasal Cannula 2.00 


 


8/31/17 18:12  100 32 152/77 (102) 95 Nasal Cannula 2.00 


 


8/31/17 18:10   35  86 Room Air  


 


8/31/17 16:01   32  93 Room Air  


 


8/31/17 15:31 97.9 122 20 164/84 (110) 91 Room Air  














 9/1/17 9/1/17 9/2/17





 14:59 22:59 06:59


 


Intake Total 600 ml  


 


Balance 600 ml  


 


   


 


IV Total 600 ml  








Result Diagram:  


9/1/17 0644                                                                    

            8/31/17 1600





Other Results





 Laboratory Tests








Test


  8/31/17


16:00 8/31/17


23:05 9/1/17


06:44


 


White Blood Count 15.8 TH/MM3   12.8 TH/MM3 


 


Red Blood Count 5.35 MIL/MM3   5.10 MIL/MM3 


 


Hemoglobin 14.7 GM/DL   14.3 GM/DL 


 


Hematocrit 45.9 %   44.0 % 


 


Mean Corpuscular Volume 85.8 FL   86.3 FL 


 


Mean Corpuscular Hemoglobin 27.5 PG   28.1 PG 


 


Mean Corpuscular Hemoglobin


Concent 32.1 % 


  


  32.6 % 


 


 


Red Cell Distribution Width 15.6 %   15.4 % 


 


Platelet Count 234 TH/MM3   198 TH/MM3 


 


Mean Platelet Volume 7.5 FL   8.2 FL 


 


Neutrophils (%) (Auto) 83.8 %   96.9 % 


 


Lymphocytes (%) (Auto) 4.5 %   1.9 % 


 


Monocytes (%) (Auto) 9.4 %   1.1 % 


 


Eosinophils (%) (Auto) 1.9 %   0.0 % 


 


Basophils (%) (Auto) 0.4 %   0.1 % 


 


Neutrophils # (Auto) 13.2 TH/MM3   12.4 TH/MM3 


 


Lymphocytes # (Auto) 0.7 TH/MM3   0.2 TH/MM3 


 


Monocytes # (Auto) 1.5 TH/MM3   0.1 TH/MM3 


 


Eosinophils # (Auto) 0.3 TH/MM3   0.0 TH/MM3 


 


Basophils # (Auto) 0.1 TH/MM3   0.0 TH/MM3 


 


CBC Comment DIFF FINAL   DIFF FINAL 


 


Differential Comment     


 


Prothrombin Time 11.3 SEC   


 


Prothromb Time International


Ratio 1.0 RATIO 


  


  


 


 


Activated Partial


Thromboplast Time 29.3 SEC 


  


  


 


 


Blood Urea Nitrogen 16 MG/DL   16 MG/DL 


 


Creatinine 0.97 MG/DL   0.82 MG/DL 


 


Random Glucose 87 MG/DL   130 MG/DL 


 


Total Protein 7.4 GM/DL   


 


Albumin 3.4 GM/DL   


 


Calcium Level 8.8 MG/DL   8.4 MG/DL 


 


Alkaline Phosphatase 84 U/L   


 


Aspartate Amino Transf


(AST/SGOT) 18 U/L 


  


  


 


 


Alanine Aminotransferase


(ALT/SGPT) 18 U/L 


  


  


 


 


Total Bilirubin 0.4 MG/DL   


 


Sodium Level 138 MEQ/L   138 MEQ/L 


 


Potassium Level 4.0 MEQ/L   4.0 MEQ/L 


 


Chloride Level 102 MEQ/L   102 MEQ/L 


 


Carbon Dioxide Level 26.6 MEQ/L   25.8 MEQ/L 


 


Anion Gap 9 MEQ/L   10 MEQ/L 


 


Estimat Glomerular Filtration


Rate 75 ML/MIN 


  


  91 ML/MIN 


 


 


Total Creatine Kinase 66 U/L   


 


Troponin I


  LESS THAN 0.02


NG/ML LESS THAN 0.02


NG/ML 


 


 


B-Type Natriuretic Peptide 27 PG/ML   


 


Thyroid Stimulating Hormone


3rd Gen 


  2.630 uIU/ML 


  


 








Imaging





Last 72 hours Impressions








CT Angiography 8/31/17 1630 Signed





Impressions: 





 Service Date/Time:  Thursday, August 31, 2017 17:28 - CONCLUSION:  1. No 





 evidence of pulmonary embolism. 2. Situs inversus totalis. 3. Bronchiectasis 





 greatest in the lung bases with chronic scarring and reticulonodular 

opacities. 





 4. Bilateral calcified pleural plaques. 5. Mediastinal and hilar adenopathy. 





 This may be reactive.      Rian Bingham MD 


 


Chest X-Ray 8/31/17 1555 Signed





Impressions: 





 Service Date/Time:  Thursday, August 31, 2017 16:41 - CONCLUSION:  1. 





 Hyperinflation underlying emphysema. 2. Coarse infiltrate in both lung bases 





 with bronchiectasis.     Rian Bingham MD 








Objective Remarks


General: NAD, AAOx3


Chest: Diffuse crackles throughout


Cardiac: Tachy, irregular


Abd: +BS, soft ND/NT


Ext: No edema





A/P


Problem List:  


(1) Pneumonia


ICD Codes:  J18.9 - Pneumonia, unspecified organism


Status:  Acute


Plan:  


- Pt is a 78 y/o male with long history of bronchiectasis and follows with Dr. Waldemar Brown, who presented to the ED for evaluation of shortness of breath. 


- Patient reports that he has been feeling short of breath for several years 

but worse the past few months.  He does NOT typically require supplemental 


 oxygen as an outpatient.  


- Pt reportedly had an outpt CT study of his chest which showed "infection".  

Patient had reportedly been on multiple antibiotics and was on ciprofloxacin


 prior to admission.  


- Pts WBC count at admission was 15.8


- CTA (8/31/17) -->  No evidence of pulmonary embolism. Situs inversus totalis. 

Bronchiectasis greatest in the lung bases with chronic scarring and


 reticulonodular opacities. Bilateral calcified pleural plaques. Mediastinal 

and hilar adenopathy which may be reactive.   


- Pt was given Levaquin and Aztreonam in the ED


- He was continued on Levaquin and Azithromycin IV following admission. 


- Cont. Duoneb treatments


- Pt was started on Solu-Medrol 40mg Q12H


- Sputum culture


- Blood cultures are pending


- It is noted that patient has underlying bronchiectasis with situs inversus 

and multiple pneumonias.  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


- Pulmonary medicine is consulted for possible bronchoscopy given his long 

history.


- Supportive care





- DVT prophylaxis with SCDs





(2) Bronchiectasis


ICD Codes:  J47.9 - Bronchiectasis, uncomplicated


Plan:  


- As noted above.





(3) Afib


ICD Codes:  I48.91 - Unspecified atrial fibrillation


Status:  Acute


Plan:  


- Pt does not have previous hx of A. fib. 


- Pt HR was in the 120's at admission. 


- This appears to be relatively new onset and may be associated with underlying 

chronic lung condition.


- 2D echo pending. 


- 2 out of 3 CE are negative, third is pending for this morning.


- Cardizem 30mg Q8H was started at admission but HR is still elevated this 

morning into the 130-140's


- We will start Cardizem 60mg Q6H and will recheck HR in about an hour and if 

still not controlled may need


 to start a Cardizem gtt





Assessment and Plan


Patient examined.


Assessment and plan formulated with Maria A Manzanares PA-C.


I agree with the above.





Pt's PO cardizem increased to 60mg q6h with good response.


will convert to long acting cardizem in the AM 9/2/17





Problem Qualifiers





(1) Pneumonia:  


Qualified Codes:  J18.9 - Pneumonia, unspecified organism


(2) Afib:  


Qualified Codes:  I48.91 - Unspecified atrial fibrillation








Maria A Manzanares Sep 1, 2017 10:57


Keshawn Arthur DO Sep 2, 2017 00:12

## 2017-09-02 VITALS
HEART RATE: 82 BPM | OXYGEN SATURATION: 99 % | SYSTOLIC BLOOD PRESSURE: 149 MMHG | RESPIRATION RATE: 19 BRPM | DIASTOLIC BLOOD PRESSURE: 70 MMHG | TEMPERATURE: 97.6 F

## 2017-09-02 VITALS
DIASTOLIC BLOOD PRESSURE: 65 MMHG | HEART RATE: 82 BPM | RESPIRATION RATE: 20 BRPM | OXYGEN SATURATION: 93 % | TEMPERATURE: 97.3 F | SYSTOLIC BLOOD PRESSURE: 129 MMHG

## 2017-09-02 VITALS
RESPIRATION RATE: 20 BRPM | SYSTOLIC BLOOD PRESSURE: 146 MMHG | OXYGEN SATURATION: 93 % | HEART RATE: 88 BPM | DIASTOLIC BLOOD PRESSURE: 67 MMHG | TEMPERATURE: 97.5 F

## 2017-09-02 VITALS — OXYGEN SATURATION: 93 %

## 2017-09-02 VITALS
RESPIRATION RATE: 19 BRPM | HEART RATE: 85 BPM | OXYGEN SATURATION: 94 % | DIASTOLIC BLOOD PRESSURE: 60 MMHG | SYSTOLIC BLOOD PRESSURE: 131 MMHG | TEMPERATURE: 97.4 F

## 2017-09-02 VITALS
SYSTOLIC BLOOD PRESSURE: 171 MMHG | RESPIRATION RATE: 18 BRPM | TEMPERATURE: 97.4 F | DIASTOLIC BLOOD PRESSURE: 78 MMHG | HEART RATE: 93 BPM | OXYGEN SATURATION: 93 %

## 2017-09-02 VITALS
HEART RATE: 79 BPM | SYSTOLIC BLOOD PRESSURE: 141 MMHG | OXYGEN SATURATION: 91 % | RESPIRATION RATE: 19 BRPM | DIASTOLIC BLOOD PRESSURE: 65 MMHG | TEMPERATURE: 97.8 F

## 2017-09-02 VITALS — SYSTOLIC BLOOD PRESSURE: 142 MMHG | DIASTOLIC BLOOD PRESSURE: 80 MMHG

## 2017-09-02 VITALS — OXYGEN SATURATION: 96 %

## 2017-09-02 VITALS — HEART RATE: 86 BPM

## 2017-09-02 LAB
ANION GAP SERPL CALC-SCNC: 11 MEQ/L (ref 5–15)
BASOPHILS # BLD AUTO: 0 TH/MM3 (ref 0–0.2)
BASOPHILS NFR BLD: 0 % (ref 0–2)
BUN SERPL-MCNC: 23 MG/DL (ref 7–18)
CHLORIDE SERPL-SCNC: 105 MEQ/L (ref 98–107)
EOSINOPHIL # BLD: 0 TH/MM3 (ref 0–0.4)
EOSINOPHIL NFR BLD: 0 % (ref 0–4)
ERYTHROCYTE [DISTWIDTH] IN BLOOD BY AUTOMATED COUNT: 15.7 % (ref 11.6–17.2)
GFR SERPLBLD BASED ON 1.73 SQ M-ARVRAT: 72 ML/MIN (ref 89–?)
HCO3 BLD-SCNC: 24.2 MEQ/L (ref 21–32)
HCT VFR BLD CALC: 42.4 % (ref 39–51)
HEMO FLAGS: (no result)
LYMPHOCYTES # BLD AUTO: 0.3 TH/MM3 (ref 1–4.8)
LYMPHOCYTES NFR BLD AUTO: 1.3 % (ref 9–44)
MAGNESIUM SERPL-MCNC: 2.1 MG/DL (ref 1.5–2.5)
MCH RBC QN AUTO: 27.6 PG (ref 27–34)
MCHC RBC AUTO-ENTMCNC: 32.3 % (ref 32–36)
MCV RBC AUTO: 85.4 FL (ref 80–100)
MONOCYTES NFR BLD: 3.4 % (ref 0–8)
NEUTROPHILS # BLD AUTO: 18.3 TH/MM3 (ref 1.8–7.7)
NEUTROPHILS NFR BLD AUTO: 95.3 % (ref 16–70)
PLATELET # BLD: 224 TH/MM3 (ref 150–450)
POTASSIUM SERPL-SCNC: 3.9 MEQ/L (ref 3.5–5.1)
RBC # BLD AUTO: 4.96 MIL/MM3 (ref 4.5–5.9)
SODIUM SERPL-SCNC: 140 MEQ/L (ref 136–145)
WBC # BLD AUTO: 19.2 TH/MM3 (ref 4–11)

## 2017-09-02 RX ADMIN — DILTIAZEM HYDROCHLORIDE SCH MG: 240 CAPSULE, EXTENDED RELEASE ORAL at 06:35

## 2017-09-02 RX ADMIN — DOCUSATE SODIUM SCH MG: 100 CAPSULE, LIQUID FILLED ORAL at 20:52

## 2017-09-02 RX ADMIN — LEVOFLOXACIN SCH MLS/HR: 5 INJECTION, SOLUTION INTRAVENOUS at 20:53

## 2017-09-02 RX ADMIN — DILTIAZEM HYDROCHLORIDE SCH MG: 240 CAPSULE, EXTENDED RELEASE ORAL at 09:00

## 2017-09-02 RX ADMIN — IPRATROPIUM BROMIDE AND ALBUTEROL SULFATE SCH AMPULE: .5; 3 SOLUTION RESPIRATORY (INHALATION) at 08:20

## 2017-09-02 RX ADMIN — METHYLPREDNISOLONE SODIUM SUCCINATE SCH MG: 40 INJECTION, POWDER, FOR SOLUTION INTRAMUSCULAR; INTRAVENOUS at 10:25

## 2017-09-02 RX ADMIN — Medication SCH ML: at 17:55

## 2017-09-02 RX ADMIN — INSULIN ASPART SCH: 100 INJECTION, SOLUTION INTRAVENOUS; SUBCUTANEOUS at 21:05

## 2017-09-02 RX ADMIN — APIXABAN SCH MG: 5 TABLET, FILM COATED ORAL at 20:53

## 2017-09-02 RX ADMIN — Medication SCH ML: at 20:53

## 2017-09-02 RX ADMIN — IPRATROPIUM BROMIDE AND ALBUTEROL SULFATE SCH AMPULE: .5; 3 SOLUTION RESPIRATORY (INHALATION) at 03:25

## 2017-09-02 RX ADMIN — IPRATROPIUM BROMIDE AND ALBUTEROL SULFATE SCH AMPULE: .5; 3 SOLUTION RESPIRATORY (INHALATION) at 16:17

## 2017-09-02 RX ADMIN — AZITHROMYCIN SCH MLS/HR: 500 INJECTION, POWDER, LYOPHILIZED, FOR SOLUTION INTRAVENOUS at 20:53

## 2017-09-02 RX ADMIN — IPRATROPIUM BROMIDE AND ALBUTEROL SULFATE SCH AMPULE: .5; 3 SOLUTION RESPIRATORY (INHALATION) at 22:05

## 2017-09-02 NOTE — EKG
Date Performed: 08/31/2017       Time Performed: 15:55:49

 

PTAGE:      77 years

 

EKG:      SINUS TACHYCARDIA WITH PACs ABNORMAL RHYTHM ECG

 

PREVIOUS TRACING       : 05/09/2005 05.48 Unable to compare as previous has no tracing

 

DOCTOR:   Nir Conklin  Interpretating Date/Time  09/02/2017 01:13:11

## 2017-09-02 NOTE — EKG
Date Performed: 09/01/2017       Time Performed: 04:49:36

 

PTAGE:      77 years

 

EKG:      Sinus tachycardia with PAC(s) Poor R wave progression Possible age undetermined lateral eddie
cardial infarction 

 

 PREVIOUS TRACING            : 08/31/2017 21.58 Compared to prior tracing no significant change

 

DOCTOR:   Nir Conklin  Interpretating Date/Time  09/02/2017 00:26:10

## 2017-09-02 NOTE — EKG
Date Performed: 08/31/2017       Time Performed: 21:58:36

 

PTAGE:      77 years

 

EKG:      Sinus rhythm 

 

 WITH FREQUENT SUPRAVENTRICULAR PREMATURE COMPLEXES POSSIBLE RIGHT VENTRICULAR HYPERTROPHY POSSIBLE A
NTERIOR MYOCARDIAL INFARCTION ABNORMAL ECG

 

PREVIOUS TRACING       : 08/31/2017 15.55 Compared to the previous tracing, extensive changes since p
revious EKG earlier in the day, possible right sided EKG with limb lead reversal

 

DOCTOR:   Nir Conklin  Interpretating Date/Time  09/02/2017 00:50:27

## 2017-09-02 NOTE — ECHRPT
Indication:   ATRIAL FIBRILLATION

 

 CONCLUSIONS

 This patient has Dextracardia.Normal left ventricular size. 

 Wall thickness is normal. 

 The left ventricular systolic function is normal with an estimated ejection fraction in the range of
 55-60%. 

 Doppler parameters are consistent with impaired left ventricular relaxtion (grade 1 diastolic dysfun
ction). 

 

 The left atrial size is mild-to-moderately dilated. 

 The right atrial size is mildly dilated. 

 The interatrial septum not well visualized. 

 

 

 Trace mitral valve regurgitation. 

 Aortic valve sclerosis is present. 

 No aortic valve regurgitation. 

 No aortic valve stenosis. 

 

 There is mild tricuspid valve regurgitation. 

 There is estimated moderate pulmonary hypertension present (range 50-60 mmHg). 

 

 The pulmonary valve is not well visualized. 

 The inferior vena cava was not well visualized. 

 

 

 BP:  134   / 85      HR: 99                       Rhythm:           Atrial fibrillation, Sinus

 

 MEASUREMENTS  (Male / Female) Normal Values       Technical Quality:Poor, Technically difficult stud
y

 2D ECHO

 LVOT Diameter                     2.2 cm                

 Aortic Root Diameter              2.6 cm                

 

 DOPPLER

 AV Peak Velocity                  165.0 cm/s            

 AV Peak Gradient                  10.9 mmHg             

 AV Mean Gradient                  6.0 mmHg              

 AV Velocity Time Integral         29.8 cm               

 LVOT Peak Velocity                132.0 cm/s            

 LVOT Peak Gradient                7.0 mmHg              

 LVOT Velocity Time Integral       20.3 cm               

 LVOT Cardiac Index                4078.1 cm/minm     

 AV Area Cont Eq vti               2.6 cm               

 AV Area Cont Eq pk                3.0 cm               

 Mitral E Point Velocity           79.5 cm/s             

 Mitral A Point Velocity           105.0 cm/s            

 Mitral E to A Ratio               0.8                   

 LV E' Lateral Velocity            8.8 cm/s              

 Mitral E to LV E' Lateral Ratio   9.1                   

 LV E' Septal Velocity             5.4 cm/s              

 Mitral E to LV E' Septal Ratio    14.8                  

 TR Peak Velocity                  315.0 cm/s            

 TR Peak Gradient                  39.7 mmHg             

 

 

 FINDINGS

 

 LEFT VENTRICLE

 Normal left ventricular size. 

 Wall thickness is normal. 

 The left ventricular systolic function is normal with an estimated ejection fraction in the range of
 55-60%. 

 Doppler parameters are consistent with impaired left ventricular relaxtion (grade 1 diastolic dysfun
ction). 

 

 RIGHT VENTRICLE

 Normal right ventricular size and systolic function. 

 

 LEFT ATRIUM

 The left atrial size is mild-to-moderately dilated. 

 

 RIGHT ATRIUM

 The right atrial size is mildly dilated. 

 

 ATRIAL SEPTUM

 The interatrial septum not well visualized. 

 

 AORTA

 The aortic root and proximal ascending aorta are normal in size on limited imaging. 

 

 MITRAL VALVE

 Structurally normal mitral valve. 

 Trace mitral valve regurgitation. 

 

 AORTIC VALVE

 Aortic valve sclerosis is present. 

 No aortic valve regurgitation. 

 No aortic valve stenosis. 

 

 TRICUSPID VALVE

 Structurally normal tricuspid valve. 

 There is mild tricuspid valve regurgitation. 

 There is estimated moderate pulmonary hypertension present (range 50-60 mmHg). 

 

 

 PULMONARY VALVE

 The pulmonary valve is not well visualized. 

 

 VESSELS

 The inferior vena cava was not well visualized. 

 

 PERICARDIUM

 No pericardial effusion. 

 

 

 

 

  Micky Salter MD, FACC, [facility] [place]

  (Electronically Signed)

  Final Date:02 September 2017 18:38

## 2017-09-02 NOTE — HHI.PR
Subjective


Remarks


Pt quite anxious today. 


c/o increased SOB with exertion





Objective


Vitals





Vital Signs








  Date Time  Temp Pulse Resp B/P (MAP) Pulse Ox O2 Delivery O2 Flow Rate FiO2


 


9/2/17 16:00 97.8 79 19 141/65 (90) 91   


 


9/2/17 12:00 97.4 85 19 131/60 (83) 94   


 


9/2/17 09:12     93   


 


9/2/17 08:00  86      


 


9/2/17 08:00 97.6 82 19 149/70 (96) 99   


 


9/2/17 08:00     96 Room Air  


 


9/2/17 04:00 97.5 88 20 146/67 (93) 93   


 


9/2/17 03:26     93   


 


9/2/17 00:15 97.3 82 20 129/65 (86) 93   


 


9/2/17 00:00      Room Air  


 


9/1/17 20:00      Room Air  


 


9/1/17 20:00 97.8 81 20 147/67 (93) 94   


 


9/1/17 19:57  81      








Result Diagram:  


9/2/17 0706                                                                    

            9/2/17 0706





Imaging





Last 72 hours Impressions








CT Angiography 8/31/17 1630 Signed





Impressions: 





 Service Date/Time:  Thursday, August 31, 2017 17:28 - CONCLUSION:  1. No 





 evidence of pulmonary embolism. 2. Situs inversus totalis. 3. Bronchiectasis 





 greatest in the lung bases with chronic scarring and reticulonodular 

opacities. 





 4. Bilateral calcified pleural plaques. 5. Mediastinal and hilar adenopathy. 





 This may be reactive.      Rian Bingham MD 


 


Chest X-Ray 8/31/17 1557 Signed





Impressions: 





 Service Date/Time:  Thursday, August 31, 2017 16:41 - CONCLUSION:  1. 





 Hyperinflation underlying emphysema. 2. Coarse infiltrate in both lung bases 





 with bronchiectasis.     Rian Bingham MD 








Objective Remarks


General: NAD, AAOx3


Chest: clear 


Cardiac: irregular


Abd: +BS, soft ND/NT


Ext: No edema





A/P


Problem List:  


(1) Pneumonia


ICD Codes:  J18.9 - Pneumonia, unspecified organism


Status:  Acute


Plan:  - comgmt with Pulm Med


- Pt is a 78 y/o male with long history of bronchiectasis and follows with Dr. Waldemar Brown, who presented to the ED for evaluation of shortness of breath. 


- Patient reports that he has been feeling short of breath for several years 

but worse the past few months.  He does NOT typically require supplemental 


 oxygen as an outpatient.  


- Pt reportedly had an outpt CT study of his chest which showed "infection".  

Patient had reportedly been on multiple antibiotics and was on ciprofloxacin


 prior to admission.  


- Pts WBC count at admission was 15.8


- CTA (8/31/17) -->  No evidence of pulmonary embolism. Situs inversus totalis. 

Bronchiectasis greatest in the lung bases with chronic scarring and


 reticulonodular opacities. Bilateral calcified pleural plaques. Mediastinal 

and hilar adenopathy which may be reactive.   


- Pt was given Levaquin and Aztreonam in the ED


- He was continued on Levaquin and Azithromycin IV following admission. 


- Cont. Duoneb treatments


- change solumedrol to prednisone. 


- Sputum culture --> pending


- Blood cultures are pending


- It is noted that patient has underlying bronchiectasis with situs inversus 

and multiple pneumonias.  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


- Supportive care


- Pt may require prn oxygen, particular for use with ambulation


- will obtain walk test


- DVT prophylaxis with SCDs


- anticipate discharge to home in 1-2 days





(2) Bronchiectasis


ICD Codes:  J47.9 - Bronchiectasis, uncomplicated


Plan:  


- As noted above.





(3) Afib


ICD Codes:  I48.91 - Unspecified atrial fibrillation


Status:  Acute


Plan:  


- Pt does NOT have previous hx of A. fib. 


- Pt HR was in the 120's at admission. 


- This appears to be relatively new onset and may be associated with underlying 

chronic lung condition.


- 2D echo done --> results pending


- cardizem changed to cardizem CD 240mg daily


- start eliquis 5mg BID


- f/u with [facility] outpt


- await echo results. 





(4) Anxiety


ICD Codes:  F41.9 - Anxiety disorder, unspecified


Plan:  - start lexapro


- xanax prn





(5) Steroid-induced hyperglycemia


ICD Codes:  R73.9 - Hyperglycemia, unspecified


Plan:  - pt states that he has prediabetes


- blood sugar running high


- obtain HgA1C


- novolog SSI








Problem Qualifiers





(1) Pneumonia:  


Qualified Codes:  J18.9 - Pneumonia, unspecified organism


(2) Afib:  


Qualified Codes:  I48.91 - Unspecified atrial fibrillation








Keshanw Arthur DO Sep 2, 2017 17:27

## 2017-09-03 VITALS
RESPIRATION RATE: 20 BRPM | TEMPERATURE: 97.5 F | SYSTOLIC BLOOD PRESSURE: 132 MMHG | OXYGEN SATURATION: 93 % | HEART RATE: 99 BPM | DIASTOLIC BLOOD PRESSURE: 66 MMHG

## 2017-09-03 VITALS
TEMPERATURE: 97.6 F | SYSTOLIC BLOOD PRESSURE: 142 MMHG | RESPIRATION RATE: 20 BRPM | OXYGEN SATURATION: 97 % | HEART RATE: 85 BPM | DIASTOLIC BLOOD PRESSURE: 74 MMHG

## 2017-09-03 VITALS — OXYGEN SATURATION: 97 %

## 2017-09-03 VITALS
RESPIRATION RATE: 20 BRPM | TEMPERATURE: 97.4 F | SYSTOLIC BLOOD PRESSURE: 152 MMHG | OXYGEN SATURATION: 97 % | HEART RATE: 104 BPM | DIASTOLIC BLOOD PRESSURE: 84 MMHG

## 2017-09-03 VITALS
HEART RATE: 85 BPM | TEMPERATURE: 97.7 F | RESPIRATION RATE: 19 BRPM | SYSTOLIC BLOOD PRESSURE: 134 MMHG | OXYGEN SATURATION: 95 % | DIASTOLIC BLOOD PRESSURE: 68 MMHG

## 2017-09-03 VITALS
DIASTOLIC BLOOD PRESSURE: 86 MMHG | SYSTOLIC BLOOD PRESSURE: 164 MMHG | OXYGEN SATURATION: 97 % | HEART RATE: 94 BPM | RESPIRATION RATE: 18 BRPM | TEMPERATURE: 97.8 F

## 2017-09-03 VITALS — HEART RATE: 85 BPM

## 2017-09-03 LAB
HEMOGLOBIN A1A: 1.7 %
HEMOGLOBIN A1B: 1.9 %
HEMOGLOBIN AO: 83.6 %
HEMOGLOBIN LA1C: 1.9 %
HEMOGLOBIN P3: 4.2 %

## 2017-09-03 RX ADMIN — DILTIAZEM HYDROCHLORIDE SCH MG: 240 CAPSULE, EXTENDED RELEASE ORAL at 07:58

## 2017-09-03 RX ADMIN — DOCUSATE SODIUM SCH MG: 100 CAPSULE, LIQUID FILLED ORAL at 07:58

## 2017-09-03 RX ADMIN — Medication SCH ML: at 08:04

## 2017-09-03 RX ADMIN — IPRATROPIUM BROMIDE AND ALBUTEROL SULFATE SCH AMPULE: .5; 3 SOLUTION RESPIRATORY (INHALATION) at 02:39

## 2017-09-03 RX ADMIN — INSULIN ASPART SCH: 100 INJECTION, SOLUTION INTRAVENOUS; SUBCUTANEOUS at 11:43

## 2017-09-03 RX ADMIN — APIXABAN SCH MG: 5 TABLET, FILM COATED ORAL at 07:59

## 2017-09-03 RX ADMIN — INSULIN ASPART SCH: 100 INJECTION, SOLUTION INTRAVENOUS; SUBCUTANEOUS at 06:22

## 2017-09-03 NOTE — HHI.DS
Discharge Summary


Admission Date


Aug 31, 2017 at 18:35


Discharge Date:  Sep 3, 2017


Admitting Diagnosis





Pneumonia, bronchietasis, new onset afib





(1) Pneumonia


Diagnosis:  Principal


ICD Codes:  J18.9 - Pneumonia, unspecified organism


Status:  Acute


(2) Bronchiectasis


Diagnosis:  Principal


ICD Codes:  J47.9 - Bronchiectasis, uncomplicated


(3) Afib


Diagnosis:  Principal


ICD Codes:  I48.91 - Unspecified atrial fibrillation


Status:  Acute


(4) Anxiety


Diagnosis:  Principal


ICD Codes:  F41.9 - Anxiety disorder, unspecified


(5) Steroid-induced hyperglycemia


Diagnosis:  Principal


ICD Codes:  R73.9 - Hyperglycemia, unspecified


Consultants


Dr. Jagdish Brown, Pulmonary Medicine


Brief History


Patient is 77-year-old male with long history of bronchiectasis, presents to 

emergency room for evaluation of shortness of breath.  Patient reports that he 

has been feeling short of breath for several years but worse the past few 

months.  He does not require supplemental oxygen as an outpatient.  Reports 

that he has been seeing Dr. Waldemar Brown for years for his bronchiectasis in 

the office and reports that he has had a workup including a CT study of his 

chest which showed "infection".  Patient reports that he has been on multiple 

antibiotics and is currently taking ciprofloxacin.  He takes doxycycline daily 

for rosacea and reportedly was on tobramycin recently.  Patient reports that he'

s had a productive cough with thick white sputum for the past few months.  

Previously the phlegm was a dark yellow color.  He denies hemoptysis or foreign 

travel.  He is originally from [place].  Has not been in the [facility] 

or [facility] recently.  Denies fevers.  Patient was seen in the office 

by Dr. Brown today, was sent to the ER as he has progressing shortness of 

breath.  Patient reports dyspnea on exertion as well as at rest.  Denies 

history of hypertension, hyperlipidemia, he is a non smoker. Reports that Dr. Brown requested a cardiac/pulmonary workup for patient as REYES has been ongoing 

and progressing over the past few months.  Denies chest pain.  Typically is 

quite active but has been limited by shortness of breath of late.  He has noted 

that her heart seems to race at times especially when he is anxious.  Reports 

that he has never had a bronchoscopy.


CBC/BMP:  


9/2/17 0706                                                                    

            9/2/17 0706





Significant Findings





Laboratory Tests








Test


  8/31/17


16:00 8/31/17


23:05 9/1/17


06:44 9/2/17


07:06


 


White Blood Count


  15.8 TH/MM3


(4.0-11.0) 


  12.8 TH/MM3


(4.0-11.0) 19.2 TH/MM3


(4.0-11.0)


 


Neutrophils (%) (Auto)


  83.8 %


(16.0-70.0) 


  96.9 %


(16.0-70.0) 95.3 %


(16.0-70.0)


 


Lymphocytes (%) (Auto)


  4.5 %


(9.0-44.0) 


  1.9 %


(9.0-44.0) 1.3 %


(9.0-44.0)


 


Monocytes (%) (Auto)


  9.4 %


(0.0-8.0) 


  


  


 


 


Neutrophils # (Auto)


  13.2 TH/MM3


(1.8-7.7) 


  12.4 TH/MM3


(1.8-7.7) 18.3 TH/MM3


(1.8-7.7)


 


Lymphocytes # (Auto)


  0.7 TH/MM3


(1.0-4.8) 


  0.2 TH/MM3


(1.0-4.8) 0.3 TH/MM3


(1.0-4.8)


 


Monocytes # (Auto)


  1.5 TH/MM3


(0-0.9) 


  


  


 


 


Estimat Glomerular Filtration


Rate 75 ML/MIN


(>89) 


  


  72 ML/MIN


(>89)


 


Troponin I


  LESS THAN 0.02


NG/ML LESS THAN 0.02


NG/ML LESS THAN 0.02


NG/ML 


 


 


Random Glucose


  


  


  130 MG/DL


() 186 MG/DL


()


 


Calcium Level


  


  


  8.4 MG/DL


(8.5-10.1) 


 


 


Blood Urea Nitrogen


  


  


  


  23 MG/DL


(7-18)


 


Hemoglobin A1c


  


  


  


  6.3 %


(4.3-6.0)








Imaging





Last Impressions








CT Angiography 8/31/17 1630 Signed





Impressions: 





 Service Date/Time:  Thursday, August 31, 2017 17:28 - CONCLUSION:  1. No 





 evidence of pulmonary embolism. 2. Situs inversus totalis. 3. Bronchiectasis 





 greatest in the lung bases with chronic scarring and reticulonodular 

opacities. 





 4. Bilateral calcified pleural plaques. 5. Mediastinal and hilar adenopathy. 





 This may be reactive.      Rian Bingham MD 


 


Chest X-Ray 8/31/17 1555 Signed





Impressions: 





 Service Date/Time:  Thursday, August 31, 2017 16:41 - CONCLUSION:  1. 





 Hyperinflation underlying emphysema. 2. Coarse infiltrate in both lung bases 





 with bronchiectasis.     Rian Bingham MD 








PE at Discharge


General: NAD, AAOx3


Chest: clear 


Cardiac: irregular


Abd: +BS, soft ND/NT


Ext: No edema


Hospital Course


(1) Pneumonia


ICD Codes:  J18.9 - Pneumonia, unspecified organism


Status:  Acute


Plan:  - comgmt with Pulm Med


- Pt is a 76 y/o male with long history of bronchiectasis and follows with Dr. Waldemar Brown, who presented to the ED for evaluation of shortness of breath. 


- Patient reports that he has been feeling short of breath for several years 

but worse the past few months.  He does NOT typically require supplemental 


 oxygen as an outpatient.  


- Pt reportedly had an outpt CT study of his chest which showed "infection".  

Patient had reportedly been on multiple antibiotics and was on ciprofloxacin


 prior to admission.  


- Pts WBC count at admission was 15.8


- CTA (8/31/17) -->  No evidence of pulmonary embolism. Situs inversus totalis. 

Bronchiectasis greatest in the lung bases with chronic scarring and


 reticulonodular opacities. Bilateral calcified pleural plaques. Mediastinal 

and hilar adenopathy which may be reactive.   


- Pt was given Levaquin and Aztreonam in the ED


- He was continued on Levaquin and Azithromycin IV following admission. 


- will complete course of antibiotics with levaquin for additional 3 days upon 

discharge.


- pt has chronic bronchiectasis


- NOT entirely clear that pt has worsening or infection at this time


- I believe that pt's SOB is multifactorial


   - It is noted that patient has underlying bronchiectasis with situs inversus 

and past, multiple pneumonias.  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


   - due to pt's bronchiectasis, but also d/t Afib which was likely going in 

and out of RVR at home


   - hopefully pt will be less SOB with his Afib now rate controlled


   - additionally pt now appears to be oxygen dependent especially with 

ambulation


   - I will arrange home oxygen


   - pt also suffers from anxiety associated with his bronchiectasis and 

chronic SOB


   - I will start pt on lexapro at 10mg and prn xanax.  F/u with his PCP, Dr. Hendricks


- Cont. Duoneb treatments at home


- change solumedrol to prednisone. Pt placed on a prednisone taper over 10 days


- Sputum culture --> pt was not able to produce sample


- Blood cultures --> NGTD


- discharge to home with C, home PT, and home oxygen


- f/u with Dr. Sudhir Hendricks, PCP, in 1 week


- f/u with Pulmonary Dr. Jagdish Brown in 2 weeks


- f/u with [facility], Dr. Jagdish Costello, in 3 weeks











(2) Bronchiectasis


ICD Codes:  J47.9 - Bronchiectasis, uncomplicated


Plan:  


- As noted above.





(3) Afib


ICD Codes:  I48.91 - Unspecified atrial fibrillation


Status:  Acute


Plan:  


- Pt does NOT have previous hx of A. fib. 


- Pt HR was in the 120's at admission. 


- This appears to be relatively new onset and may be associated with underlying 

chronic lung condition.


- 2D echo (9/2/17) --> EF 55-60%, grade 1 diastolic dysfunction


- cardizem CD 240mg daily


-  eliquis 5mg BID, review antiplatelet therapy at f/u with Cardiology








(4) Anxiety


ICD Codes:  F41.9 - Anxiety disorder, unspecified


Plan:  - start lexapro


- xanax prn





(5) Steroid-induced hyperglycemia


ICD Codes:  R73.9 - Hyperglycemia, unspecified


Plan:  - pt states that he has prediabetes


- blood sugar running high d/t steroid hyperglycemia


- HgA1C 6.2


- NO OHA at this time


- pt needs to observe 2,000 calorie ADA diet


- f/u with PCP


Pt Condition on Discharge:  Stable


Discharge Disposition:  Disch w/ Home Health Serv


Discharge Instructions


DIET: Follow Instructions for:  Heart Healthy Diet, Diabetic Diet


Activities you can perform:  Regular-No Restrictions


Activities to Avoid:  Driving for 24 hrs, Strenuous Activity


Follow up Referrals:  


Cardiology - 3 Weeks with Dr. Jagdish Costello


PCP Follow-up - 1 Week with Dr. Sudhir Hendricks


Pulmonology - 2 Weeks with KINSEY Brown MD





New Medications:  


Levofloxacin (Levaquin) 500 Mg Tablet


500 MG PO DAILY for Infection for 3 Days, #3 TAB 0 Refills





Oxygen (O2) (Oxygen (O2))  Device


LITER АЛЕКСАНДР.CANULA CONTINUOUS for Prevent Hypoxemia, #2


Oxygen Concentrator Portable Gaseous


 2 L/min via Nasal Canula Continuous


 For 99 months


Alprazolam (Xanax) 0.25 Mg Tab


0.25 MG PO Q6H PRN for anxiety, #20 TAB 0 Refills





Apixaban (Eliquis) 5 Mg Tab


5 MG PO BID for afib, #60 TAB 0 Refills





Diltiazem CD 24 HR (Cardizem CD 24 HR) 240 Mg Caper


240 MG PO DAILY for afib, #30 CAP 0 Refills





Docusate Sodium (Dok) 100 Mg Cap


100 MG PO BID for constipation, #60 CAP 0 Refills





Escitalopram (Escitalopram) 10 Mg Tab


10 MG PO DAILY for anxiety, #30 TAB 0 Refills





Prednisone (Prednisone) 20 Mg Tab


20 MG PO BID for bronchiectasis, #10 TAB


20mg daily x 5days


 10mg daily x 5days


 then stop


 


Continued Medications:  


Albuterol Neb (Albuterol Neb) 1.25 Mg/3 Ml Neb


1.25 MG NEB Q4HR NEB PRN for SHORTNESS OF BREATH, NEBULE 0 Refills





Ipratropium Neb (Ipratropium Neb) 0.5 Mg/2.5 Ml Amp


0.5 MG NEB Q12HR NEB for Breathing Treatment, NEBULE 0 Refills





 


Discontinued Medications:  


Ciprofloxacin (Cipro) 500 Mg Tab


500 MG PO BID for Infection, TAB 0 Refills

















Keshawn Arthur DO Sep 3, 2017 15:06

## 2017-09-03 NOTE — HHI.FF
Face to Face Verification


Diagnosis:  


(1) Bronchiectasis


(2) Anxiety


(3) Afib


Physical Therapy


Order:  Evaluate and Treat, Improve ambulation, Strength and gait training





Home Health Nursing








Order: Medical education





 Signs/symptoms of disease process





 Oxygen administration education





 Medication education-adverse effect





 Nursing assessment with vital signs

















I have seen patient Zachary Monroe on 9/3/17. My clinical findings 

support the need for the requested home health care services because:








 Ltd mobility - disease progression





 Patient has SOB





 Deconditioned w/ increased weakness





 Med compliance is questionable





 Limited ability to care for self





 Need for psychosocial assistance














I certify that my clinical findings support that this patient is homebound 

because:








 Hx COPD- exertion dyspnea/weakness





 Unsafe to leave home unassisted





 Need for psychosocial assistance





 Unable to use public transportation

















Keshawn Arthur DO Sep 3, 2017 14:43

## 2018-02-21 ENCOUNTER — HOSPITAL ENCOUNTER (OUTPATIENT)
Dept: HOSPITAL 17 - PHRSP | Age: 78
End: 2018-02-21
Attending: INTERNAL MEDICINE
Payer: MEDICARE

## 2018-02-21 DIAGNOSIS — J44.9: Primary | ICD-10-CM

## 2018-02-21 PROCEDURE — 94729 DIFFUSING CAPACITY: CPT

## 2018-02-21 PROCEDURE — 94618 PULMONARY STRESS TESTING: CPT

## 2018-02-21 PROCEDURE — 94726 PLETHYSMOGRAPHY LUNG VOLUMES: CPT

## 2018-02-21 PROCEDURE — 94060 EVALUATION OF WHEEZING: CPT

## 2018-02-23 NOTE — RSPPFT
DATE OF PROCEDURE:   2/21/18



COMMENTS:   



VOLUMES

DYNAMIC:    FVC and FEV1 moderately reduced.

STATIC:    FRC and RV moderately increased; TLC normal.

FLOWS:    FEV1% moderately reduced; FEF 25-75 severely reduced.

DIFFUSION:    Moderately reduced.

FLOW VOLUME

      LOOP:    Pattern of variable intrathoracic airways obstruction.  



IMPRESSION:    

   

Moderately severe obstructive ventilatory defect with reduction in diffusion and 
significant hyperinflation consistent with emphysema. No significant improvement 
post-bronchodilator.